# Patient Record
Sex: MALE | Race: BLACK OR AFRICAN AMERICAN | NOT HISPANIC OR LATINO | Employment: FULL TIME | URBAN - METROPOLITAN AREA
[De-identification: names, ages, dates, MRNs, and addresses within clinical notes are randomized per-mention and may not be internally consistent; named-entity substitution may affect disease eponyms.]

---

## 2023-12-18 ENCOUNTER — APPOINTMENT (OUTPATIENT)
Dept: RADIOLOGY | Facility: HOSPITAL | Age: 33
End: 2023-12-18
Payer: COMMERCIAL

## 2023-12-18 ENCOUNTER — HOSPITAL ENCOUNTER (EMERGENCY)
Facility: HOSPITAL | Age: 33
Discharge: HOME | End: 2023-12-18
Payer: COMMERCIAL

## 2023-12-18 VITALS
TEMPERATURE: 99 F | DIASTOLIC BLOOD PRESSURE: 65 MMHG | OXYGEN SATURATION: 97 % | SYSTOLIC BLOOD PRESSURE: 123 MMHG | RESPIRATION RATE: 16 BRPM | BODY MASS INDEX: 22.53 KG/M2 | HEIGHT: 76 IN | WEIGHT: 185 LBS | HEART RATE: 63 BPM

## 2023-12-18 DIAGNOSIS — M25.561 ACUTE PAIN OF RIGHT KNEE: Primary | ICD-10-CM

## 2023-12-18 PROCEDURE — 73564 X-RAY EXAM KNEE 4 OR MORE: CPT | Mod: RT,FY

## 2023-12-18 PROCEDURE — 73590 X-RAY EXAM OF LOWER LEG: CPT | Mod: RT,FY

## 2023-12-18 PROCEDURE — 73700 CT LOWER EXTREMITY W/O DYE: CPT | Mod: RIGHT SIDE | Performed by: STUDENT IN AN ORGANIZED HEALTH CARE EDUCATION/TRAINING PROGRAM

## 2023-12-18 PROCEDURE — 73564 X-RAY EXAM KNEE 4 OR MORE: CPT | Mod: RIGHT SIDE | Performed by: INTERNAL MEDICINE

## 2023-12-18 PROCEDURE — 99285 EMERGENCY DEPT VISIT HI MDM: CPT | Performed by: PHYSICIAN ASSISTANT

## 2023-12-18 PROCEDURE — 73502 X-RAY EXAM HIP UNI 2-3 VIEWS: CPT | Mod: RIGHT SIDE | Performed by: INTERNAL MEDICINE

## 2023-12-18 PROCEDURE — 2500000001 HC RX 250 WO HCPCS SELF ADMINISTERED DRUGS (ALT 637 FOR MEDICARE OP)

## 2023-12-18 PROCEDURE — 73552 X-RAY EXAM OF FEMUR 2/>: CPT | Mod: RT,FY

## 2023-12-18 PROCEDURE — 73502 X-RAY EXAM HIP UNI 2-3 VIEWS: CPT | Mod: RT,FY

## 2023-12-18 PROCEDURE — 73700 CT LOWER EXTREMITY W/O DYE: CPT | Mod: RT

## 2023-12-18 PROCEDURE — 73590 X-RAY EXAM OF LOWER LEG: CPT | Mod: RIGHT SIDE | Performed by: INTERNAL MEDICINE

## 2023-12-18 PROCEDURE — 99284 EMERGENCY DEPT VISIT MOD MDM: CPT | Mod: 25

## 2023-12-18 PROCEDURE — 73552 X-RAY EXAM OF FEMUR 2/>: CPT | Mod: RIGHT SIDE | Performed by: INTERNAL MEDICINE

## 2023-12-18 RX ORDER — ACETAMINOPHEN 325 MG/1
650 TABLET ORAL EVERY 6 HOURS PRN
Qty: 30 TABLET | Refills: 0 | Status: SHIPPED | OUTPATIENT
Start: 2023-12-18

## 2023-12-18 RX ORDER — IBUPROFEN 600 MG/1
600 TABLET ORAL ONCE
Status: COMPLETED | OUTPATIENT
Start: 2023-12-18 | End: 2023-12-18

## 2023-12-18 RX ORDER — IBUPROFEN 600 MG/1
TABLET ORAL
Status: COMPLETED
Start: 2023-12-18 | End: 2023-12-18

## 2023-12-18 RX ORDER — IBUPROFEN 600 MG/1
600 TABLET ORAL EVERY 6 HOURS PRN
Qty: 30 TABLET | Refills: 0 | Status: SHIPPED | OUTPATIENT
Start: 2023-12-18

## 2023-12-18 RX ADMIN — IBUPROFEN 600 MG: 600 TABLET ORAL at 08:22

## 2023-12-18 RX ADMIN — IBUPROFEN 600 MG: 600 TABLET, FILM COATED ORAL at 08:22

## 2023-12-18 ASSESSMENT — COLUMBIA-SUICIDE SEVERITY RATING SCALE - C-SSRS
2. HAVE YOU ACTUALLY HAD ANY THOUGHTS OF KILLING YOURSELF?: NO
1. IN THE PAST MONTH, HAVE YOU WISHED YOU WERE DEAD OR WISHED YOU COULD GO TO SLEEP AND NOT WAKE UP?: NO
6. HAVE YOU EVER DONE ANYTHING, STARTED TO DO ANYTHING, OR PREPARED TO DO ANYTHING TO END YOUR LIFE?: NO

## 2023-12-18 ASSESSMENT — LIFESTYLE VARIABLES
HAVE YOU EVER FELT YOU SHOULD CUT DOWN ON YOUR DRINKING: NO
HAVE PEOPLE ANNOYED YOU BY CRITICIZING YOUR DRINKING: NO
REASON UNABLE TO ASSESS: NO
EVER FELT BAD OR GUILTY ABOUT YOUR DRINKING: NO
EVER HAD A DRINK FIRST THING IN THE MORNING TO STEADY YOUR NERVES TO GET RID OF A HANGOVER: NO

## 2023-12-18 ASSESSMENT — PAIN - FUNCTIONAL ASSESSMENT: PAIN_FUNCTIONAL_ASSESSMENT: 0-10

## 2023-12-18 NOTE — Clinical Note
Femi Guero was seen and treated in our emergency department on 12/18/2023.  He may return to work on 12/19/2023.       If you have any questions or concerns, please don't hesitate to call.      Petr Llanos PA-C

## 2023-12-18 NOTE — CONSULTS
Reason For Consult  C/f for right lateral tibial plateau fracture    History Of Present Illness  Femi Jack is a 33 y.o. male presenting with above after diving on the ground while playing basketball onto the right leg. Did not hear a pop or anything, but had a lot of pain in the knee right after. Went home and iced it but today is still having a lot of pain. Feels sort of unstable when he walks on it. Denies any knee injuries in the past. No numbness or tingling down the lower extremity.      Past Medical History  Denied any PMH    Surgical History  Denied any PSH     Social History  No nicotine use     Family History  No family history on file.     Allergies  Patient has no known allergies.    Review of Systems  Review of Systems  A complete review of systems was conducted, pertinent only to the HPI noted above.    Constitutional: None    Eyes: No additions to above history    Ears, Nose, Throat: No additions to above history    Cardiovascular: WWP upper and lower extremities     Respiratory:  non labored breathing on room air     GI: No additions to above history    : No additions to above history    Skin/Neuro: No additions to above history    Endocrine/Heme/Lymph: No additions to above history    Immunologic: No additions to above history    Psychiatric:  appropriate mood and behavior     Musculoskeletal: see above       Physical Exam  Right Lower Extremity:   -Skin intact  -mild to moderate effusion present   -Tender at the lateral joint line and pain with PROM   -Fires DF/PF/EHL/FHL  -SILT in saph/sural/SPN/DPN distributions  -Foot warm, well perfused  -Palpable DP pulse, brisk cap refill  -Compartments soft and compressible  Knee ROM to 0-90 degrees with no lag. Negative ligamentous exam.  Pain with varus stress test with no laxity  No lag with SLR    No other exams noted on the secondary injury        Last Recorded Vitals  Blood pressure 123/65, pulse 63, temperature 37.2 °C (99 °F), temperature  "source Temporal, resp. rate 16, height 1.93 m (6' 4\"), weight 83.9 kg (185 lb), SpO2 97 %.    Relevant Results  XR femur right 2+ views   Final Result   There are again findings concerning for a nondisplaced nondisplaced   fracture involving the peripheral aspect of the lateral tibial   plateau, with fracture fragments in similar alignment. A moderate   volume knee joint effusion is noted. No other definite acute osseous   changes of the right femur or tibia/fibula.        Nonspecific rounded osseous bodies adjacent to the superior   acetabulum, which can represent accessory ossicle versus   age-indeterminate avulsion fracture deformities.        There is a cam morphology of the right femoral head neck junction,   which can be seen with femoroacetabular impingement.        MACRO:   None        Signed by: Karen Jensen 12/18/2023 11:55 AM   Dictation workstation:   [id number]      XR tibia fibula right 2 views   Final Result   There are again findings concerning for a nondisplaced nondisplaced   fracture involving the peripheral aspect of the lateral tibial   plateau, with fracture fragments in similar alignment. A moderate   volume knee joint effusion is noted. No other definite acute osseous   changes of the right femur or tibia/fibula.        Nonspecific rounded osseous bodies adjacent to the superior   acetabulum, which can represent accessory ossicle versus   age-indeterminate avulsion fracture deformities.        There is a cam morphology of the right femoral head neck junction,   which can be seen with femoroacetabular impingement.        MACRO:   None        Signed by: Karen Jensen 12/18/2023 11:55 AM   Dictation workstation:   [id number]      XR hip right with pelvis when performed 2 or 3 views   Final Result   PELVIS AND RIGHT HIP:   There are rounded osseous bodies arising off of the superior   acetabula bilaterally, which can represent accessory ossicles or   age-indeterminate avulsion fracture " deformities. Correlation with   clinical findings may be of value. No other evidence of acute osseous   changes.        Cam morphology of the bilateral femoral head neck junction is noted,   which can be seen with femoroacetabular impingement.        Right knee x-ray:   There are findings concerning for a nondisplaced and nondepressed   fracture involving the peripheral aspect of the lateral tibial   plateau, with a large knee joint effusion noted.        I personally reviewed the images/study and I agree with the findings   as stated by Ronaldo Kelly MD.        This study was interpreted at [place], [place].        MACRO:   None        Signed by: Karen Jensen 12/18/2023 10:47 AM   Dictation workstation:   [id number]      XR knee right 4+ views   Final Result   PELVIS AND RIGHT HIP:   There are rounded osseous bodies arising off of the superior   acetabula bilaterally, which can represent accessory ossicles or   age-indeterminate avulsion fracture deformities. Correlation with   clinical findings may be of value. No other evidence of acute osseous   changes.        Cam morphology of the bilateral femoral head neck junction is noted,   which can be seen with femoroacetabular impingement.        Right knee x-ray:   There are findings concerning for a nondisplaced and nondepressed   fracture involving the peripheral aspect of the lateral tibial   plateau, with a large knee joint effusion noted.        I personally reviewed the images/study and I agree with the findings   as stated by Ronaldo Kelly MD.        This study was interpreted at [place], [place].        MACRO:   None        Signed by: Karen Jensen 12/18/2023 10:47 AM   Dictation workstation:   [id number]      CT knee right wo IV contrast    (Results Pending)         Assessment/Plan     Patient is a 33M who presents with c/f right lateral tibial plateau  fracture from landing on the RLE while playing basketball yesterday. No gross ligamentous laxity of the right knee. No lag with SLR but moderate effusion present. CT scan demonstrated no lateral tibial plateau fracture.     Plan:  WB:  WBAT in a knee immobilizer for comfort  Pain control per ED  Rest, ice and elevation  Continue home medications  No dvt ppx required  Patient to follow up with Dr. Thomas in two weeks as an outpatient visit    Discussed with on call attending    Jaime Reza DO  Orthopedic Surgery, PGY3    This patient was seen within 30 minutes from being contacted about the consult         Jaime Reza DO

## 2023-12-18 NOTE — ED PROVIDER NOTES
HPI:  33-year-old male otherwise healthy presents complaining of right knee and hip pain.  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  States injury was yesterday afternoon.  Initially pain was only in the hip however when he got up there is pain in his knee.  He is able to ambulate despite the pain.  Has not taken anything for the pain.  Denies any weakness or paresthesias.      Physical Exam:   GEN: Vitals noted. NAD  EYES:  EOMs grossly intact, anicteric sclera  BETH: Mucosa moist.  NECK: Supple.  CARD: RRR  PULMONARY: Moving air well. Clear all lung fields.  ABDOMEN: Soft, no guarding, no rigidity. Nontender. NABS  EXTREMITIES: Full ROM, no pitting edema, mild diffuse anterior right knee tenderness without any erythema ecchymosis swelling warmth, negative varus valgus stress test, negative anterior posterior drawer test, intact sensation strength distally with brisk capillary fill, mild diffuse anterior/lateral hip tenderness without any overlying skin change  SKIN: Intact, warm and dry  NEURO: Alert and oriented x 3, speech is clear, no obvious deficits noted.       ----------------------------------------------------------------------------------------------------------------------------    MDM:  33-year-old male presenting for right hip/knee pain.  On exam he is well-appearing sitting up comfortably.  Ambulates with obvious slight discomfort in the right.  Mild diffuse tenderness to the right hip and knee, negative ligamentous tests, neurovascular intact to the distal extremity.  Will provide ibuprofen and perform plain film x-rays.  X-ray shows questionable issue with the right hip however does not have remarkable pain or tenderness in this area and is most likely remote/incidental.  There are signs of a slight tibial plateau fracture.  I spoke with orthopedist who would like further imaging are ordered and they will see him after.  CT shows no acute fracture, remote  changes seen.  Incidental findings of osteophytes and small effusion and osteoarthritis visualized.  I spoke with orthopedics who recommended weightbearing as tolerated, knee immobilizer.  He is given knee immobilizer and crutches.  Told to call Ortho for follow-up.  Perform RICE.  Return precautions reviewed.  He verbalized understanding discharge plan he agreed with plan and questions were answered.    CT knee right wo IV contrast   Final Result   1. Findings most in keeping with remote posttraumatic changes without   acute fracture or dislocation.   2. Small ossific body in the posterior aspect of the knee joint.   3. Moderate suprapatellar knee joint effusion.   4. Mild lateral and lateral compartment osteoarthrosis with   osteophyte formation.        I personally reviewed the images/study and I agree with the findings   as stated by resident physician Dr. Anatoly Lazar.        MACRO:   None        Signed by: Randolph Malone 12/18/2023 2:46 PM   Dictation workstation:   [id number]      XR femur right 2+ views   Final Result   There are again findings concerning for a nondisplaced nondisplaced   fracture involving the peripheral aspect of the lateral tibial   plateau, with fracture fragments in similar alignment. A moderate   volume knee joint effusion is noted. No other definite acute osseous   changes of the right femur or tibia/fibula.        Nonspecific rounded osseous bodies adjacent to the superior   acetabulum, which can represent accessory ossicle versus   age-indeterminate avulsion fracture deformities.        There is a cam morphology of the right femoral head neck junction,   which can be seen with femoroacetabular impingement.        MACRO:   None        Signed by: Karen Jensen 12/18/2023 11:55 AM   Dictation workstation:   [id number]      XR tibia fibula right 2 views   Final Result   There are again findings concerning for a nondisplaced nondisplaced   fracture involving the peripheral aspect  of the lateral tibial   plateau, with fracture fragments in similar alignment. A moderate   volume knee joint effusion is noted. No other definite acute osseous   changes of the right femur or tibia/fibula.        Nonspecific rounded osseous bodies adjacent to the superior   acetabulum, which can represent accessory ossicle versus   age-indeterminate avulsion fracture deformities.        There is a cam morphology of the right femoral head neck junction,   which can be seen with femoroacetabular impingement.        MACRO:   None        Signed by: Karen Jensen 12/18/2023 11:55 AM   Dictation workstation:   [id number]      XR hip right with pelvis when performed 2 or 3 views   Final Result   PELVIS AND RIGHT HIP:   There are rounded osseous bodies arising off of the superior   acetabula bilaterally, which can represent accessory ossicles or   age-indeterminate avulsion fracture deformities. Correlation with   clinical findings may be of value. No other evidence of acute osseous   changes.        Cam morphology of the bilateral femoral head neck junction is noted,   which can be seen with femoroacetabular impingement.        Right knee x-ray:   There are findings concerning for a nondisplaced and nondepressed   fracture involving the peripheral aspect of the lateral tibial   plateau, with a large knee joint effusion noted.        I personally reviewed the images/study and I agree with the findings   as stated by Ronaldo Kelly MD.        This study was interpreted at [facility], [place], [place].        MACRO:   None        Signed by: Karen Jensen 12/18/2023 10:47 AM   Dictation workstation:   [id number]      XR knee right 4+ views   Final Result   PELVIS AND RIGHT HIP:   There are rounded osseous bodies arising off of the superior   acetabula bilaterally, which can represent accessory ossicles or   age-indeterminate avulsion fracture deformities. Correlation with   clinical  findings may be of value. No other evidence of acute osseous   changes.        Cam morphology of the bilateral femoral head neck junction is noted,   which can be seen with femoroacetabular impingement.        Right knee x-ray:   There are findings concerning for a nondisplaced and nondepressed   fracture involving the peripheral aspect of the lateral tibial   plateau, with a large knee joint effusion noted.        I personally reviewed the images/study and I agree with the findings   as stated by Ronaldo Kelly MD.        This study was interpreted at [facility], [place], [place].        MACRO:   None        Signed by: Karen Jensen 12/18/2023 10:47 AM   Dictation workstation:   [id number]        Labs Reviewed - No data to display  Diagnoses as of 12/18/23 1526   Acute pain of right knee     ----------------------------------------------------------------------------------------------------------------------------    This note was dictated using a speech recognition program.  While an attempt was made at proof reading to minimize errors, minor errors in transcription may be present call for questions.     Petr Llanos PA-C  12/18/23 1527

## 2023-12-18 NOTE — DISCHARGE INSTRUCTIONS
You are able to bear weight on your leg.  Use the crutches when using the knee immobilizer.  Perform RICE: Rest, ice, compression, elevation.  Take the Tylenol/ibuprofen for pain relief.  Follow-up with orthopedist at [phone].